# Patient Record
Sex: FEMALE | Race: WHITE | URBAN - METROPOLITAN AREA
[De-identification: names, ages, dates, MRNs, and addresses within clinical notes are randomized per-mention and may not be internally consistent; named-entity substitution may affect disease eponyms.]

---

## 2018-01-16 NOTE — PROGRESS NOTES
Assessment    1  Localized primary carpometacarpal osteoarthritis (715 14) (M19 049)    Plan  Localized primary carpometacarpal osteoarthritis    · Betamethasone Sod Phos & Acet 6 (3-3) MG/ML Injection Suspension;  INJECT 1  ML Intra-articular; To Be Done: 20Jan2016   · Betamethasone Sod Phos & Acet 6 (3-3) MG/ML Injection Suspension   · You may continue or resume your normal level of activity ; Status:Complete;   Done:  [id number]   · Joint Bursa Injection Small - POC; Status:Complete;   Done: [id number]   · Joint Bursa Injection Small - POC; Status:Complete;   Done: [id number]   · Follow-up visit in 4 Months Evaluation and Treatment  Follow-up  Status: Complete  Done:  20Jan2016    Discussion/Summary    Patient opted for steroid injections of bilateral CMC joints today  These were given in the office today  patient tolerated the procedure well  Patient should continue with routine activities, no limitations  We'll have the patient followup in 4 months time for reevaluation and possible repeat injections  Patient to call for any questions or concerns arise before then  Chief Complaint    1  Hand Problem    History of Present Illness  HPI: Patient is a very pleasant 56-year-old female who presents today for reevaluation of bilateral CMC joint arthritis  Patient has received previous steroid injections in the past and these do seem to help  Right hand is worse than left  The past medical history, surgical history, family history, social history, medications, allergies, and review of systems have been read and reviewed on the chart and have been updated  Review of Systems was recorded in the office today on a separate evaluation sheet and is listed below  Review of Systems    Constitutional: No fever, no chills, feels well, no tiredness, no recent weight gain or loss  Eyes: No complaints of eyesight problems, no red eyes  ENT: no loss of hearing, no nosebleeds, no sore throat     Cardiovascular: No complaints of chest pain, no palpitations, no leg claudication or lower extremity edema  Respiratory: no compliants of shortness of breath, no wheezing, no cough  Gastrointestinal: no complaints of abdominal pain, no constipation, no nausea or diarrhea, no vomiting, no bloody stools  Genitourinary: no complaints of dysuria, no incontinence  Musculoskeletal: as noted in HPI  Integumentary: no complaints of skin rash or lesion, no itching or dry skin, no skin wounds  Neurological: no complaints of headache, no confusion, no numbness or tingling, no dizziness  Endocrine: No complaints of muscle weakness, no feelings of weakness, no frequent urination, no excessive thirst    Psychiatric: No suicidal thoughts, no anxiety, no feelings of depression  Active Problems    1  Localized primary carpometacarpal osteoarthritis (715 14) (M19 049)    Current Meds   1  Synthroid TABS Recorded    Allergies    1  Penicillin V Potassium TABS    Vitals  Signs [Data Includes: Current Encounter]    Heart Rate: 60  Systolic: 586  Diastolic: 78  Height: 5 ft 4 in  Weight: 114 lb   BMI Calculated: 19 57  BSA Calculated: 1 55    Physical Exam      General: No acute distress, age-appropriate  Cardiovascular: No discernable arrhythmia  Respiratory: Breathing is even and unlabored, no stridor or audible wheezing  Psychiatric: Awake alert and oriented x3, normal mood and affect  Abdomen: Without rebound or guarding  Left Basal Joint: CMC and Grind, but No MP Hyperextension and No MP Ulnar laxity (Normal appearance  Neurovascularly intact)   Right Basal Joint: CMC and Grind, but No MP Hyperextension and No MP Ulnar laxity (Normal appearance  Neurovascularly intact)       Procedure           Injection: After appropriate confirmation of the patient, site, and procedure, the Bilateral CMC joints was prepared in a sterile fashion   The area was then injected with 6 milligrams of Celestone and 1 of 1% lidocaine without epinephrine  A sterile bandage was then applied  The patient tolerated the procedure well  All risks, benefits, and potential complications of the steroid injection were discussed with the patient, which were including but not limited to: Bleeding, small risk of infection, numbness and tingling within the area of injection, soreness at the injection site, potential brief exacerbation of pain, as well as steroid reactions including flushing, increased aggravation, and potential increased blood sugar in diabetics  Future Appointments    Date/Time Provider Specialty Site   05/18/2016 08:30 AM JANET Shea   Orthopedic Surgery [facility]     Signatures   Electronically signed by : Bette Coates, [facility]; Jan 20 2016  9:42AM EST                       (Author)    Electronically signed by : JANET Sawant ; Jan 20 2016  1:14PM EST

## 2020-06-01 ENCOUNTER — DOCTOR'S OFFICE (OUTPATIENT)
Dept: URBAN - METROPOLITAN AREA CLINIC 136 | Facility: CLINIC | Age: 55
Setting detail: OPHTHALMOLOGY
End: 2020-06-01
Payer: COMMERCIAL

## 2020-06-01 DIAGNOSIS — H52.223: ICD-10-CM

## 2020-06-01 DIAGNOSIS — H35.3121: ICD-10-CM

## 2020-06-01 DIAGNOSIS — H52.13: ICD-10-CM

## 2020-06-01 DIAGNOSIS — H52.4: ICD-10-CM

## 2020-06-01 PROCEDURE — 92310 CONTACT LENS FITTING OU: CPT | Performed by: OPTOMETRIST

## 2020-06-01 PROCEDURE — 92015 DETERMINE REFRACTIVE STATE: CPT | Performed by: OPTOMETRIST

## 2020-06-01 PROCEDURE — 92004 COMPRE OPH EXAM NEW PT 1/>: CPT | Performed by: OPTOMETRIST

## 2020-06-01 PROCEDURE — 92134 CPTRZ OPH DX IMG PST SGM RTA: CPT | Performed by: OPTOMETRIST

## 2020-06-01 ASSESSMENT — CONFRONTATIONAL VISUAL FIELD TEST (CVF)
OD_FINDINGS: FULL
OS_FINDINGS: FULL

## 2020-06-02 ASSESSMENT — REFRACTION_MANIFEST
OS_ADD: +2.25
OS_VA1: 20/20
OD_VA1: 20/20
OD_SPHERE: -5.50
OD_ADD: +2.25
OS_AXIS: 180
OD_AXIS: 175
OS_CYLINDER: -1.75
OS_SPHERE: -4.50
OU_VA: 20/20
OD_CYLINDER: -0.75

## 2020-06-02 ASSESSMENT — SPHEQUIV_DERIVED
OS_SPHEQUIV: -5.375
OD_SPHEQUIV: -5.875
OS_SPHEQUIV: -5.25
OD_SPHEQUIV: -2.5

## 2020-06-02 ASSESSMENT — REFRACTION_CURRENTRX
OS_SPHERE: -4.50
OD_AXIS: 173
OS_OVR_VA: 20/
OS_VPRISM_DIRECTION: PROGS
OD_SPHERE: -5.50
OD_ADD: +2.00
OD_VPRISM_DIRECTION: PROGS
OS_ADD: +2.00
OD_CYLINDER: -1.00
OS_CYLINDER: -2.25
OD_OVR_VA: 20/
OS_AXIS: 0

## 2020-06-02 ASSESSMENT — REFRACTION_AUTOREFRACTION
OD_SPHERE: -2.25
OS_SPHERE: -4.50
OS_AXIS: 017
OS_CYLINDER: -1.50
OD_AXIS: 175
OD_CYLINDER: -0.50

## 2020-06-02 ASSESSMENT — VISUAL ACUITY
OD_BCVA: 20/20
OS_BCVA: 20/20

## 2020-06-04 ENCOUNTER — OPTICAL OFFICE (OUTPATIENT)
Dept: URBAN - METROPOLITAN AREA CLINIC 143 | Facility: CLINIC | Age: 55
Setting detail: OPHTHALMOLOGY
End: 2020-06-04
Payer: COMMERCIAL

## 2020-06-04 DIAGNOSIS — H52.223: ICD-10-CM

## 2020-06-04 PROCEDURE — V2025 EYEGLASSES DELUX FRAMES: HCPCS | Performed by: OPTOMETRIST

## 2020-06-04 PROCEDURE — V2781 PROGRESSIVE LENS PER LENS: HCPCS | Performed by: OPTOMETRIST

## 2020-06-04 PROCEDURE — V2207 LENS SPHCY BIFOCAL 4.25-7D/.: HCPCS | Performed by: OPTOMETRIST

## 2020-06-04 PROCEDURE — V2750 ANTI-REFLECTIVE COATING: HCPCS | Performed by: OPTOMETRIST

## 2020-06-04 PROCEDURE — V2020 VISION SVCS FRAMES PURCHASES: HCPCS | Performed by: OPTOMETRIST

## 2020-07-16 ENCOUNTER — RX ONLY (RX ONLY)
Age: 55
End: 2020-07-16

## 2020-07-16 ENCOUNTER — DOCTOR'S OFFICE (OUTPATIENT)
Dept: URBAN - METROPOLITAN AREA CLINIC 136 | Facility: CLINIC | Age: 55
Setting detail: OPHTHALMOLOGY
End: 2020-07-16

## 2020-07-16 DIAGNOSIS — H52.13: ICD-10-CM

## 2020-07-16 PROBLEM — H52.223 ASTIGMATISM, REGULAR; BOTH EYES: Status: ACTIVE | Noted: 2020-06-01

## 2020-07-16 PROBLEM — H52.4 PRESBYOPIA ; BOTH EYES: Status: ACTIVE | Noted: 2020-06-01

## 2020-07-16 PROCEDURE — CLFUP CONTACT LENS FOLLOW-UP: Performed by: OPTOMETRIST

## 2020-07-16 ASSESSMENT — REFRACTION_CURRENTRX
OD_AXIS: 173
OD_CYLINDER: -1.00
OS_CYLINDER: -2.25
OD_OVR_VA: 20/
OS_VPRISM_DIRECTION: PROGS
OS_ADD: +2.00
OS_AXIS: 0
OD_ADD: +2.00
OD_VPRISM_DIRECTION: PROGS
OS_OVR_VA: 20/
OS_SPHERE: -4.50
OD_SPHERE: -5.50

## 2020-07-16 ASSESSMENT — SPHEQUIV_DERIVED
OD_SPHEQUIV: -5.875
OS_SPHEQUIV: -5.25
OS_SPHEQUIV: -5.375
OD_SPHEQUIV: -2.5

## 2020-07-16 ASSESSMENT — VISUAL ACUITY
OD_BCVA: 20/25
OS_BCVA: 20/20

## 2020-07-16 ASSESSMENT — REFRACTION_MANIFEST
OS_CYLINDER: -1.75
OU_VA: 20/20
OS_AXIS: 180
OD_VA1: 20/20
OS_VA1: 20/20
OS_ADD: +2.25
OD_ADD: +2.25
OD_CYLINDER: -0.75
OD_AXIS: 175
OS_SPHERE: -4.50
OD_SPHERE: -5.50

## 2020-07-16 ASSESSMENT — REFRACTION_AUTOREFRACTION
OS_SPHERE: -4.50
OD_AXIS: 175
OS_CYLINDER: -1.50
OS_AXIS: 017
OD_SPHERE: -2.25
OD_CYLINDER: -0.50

## 2021-03-26 DIAGNOSIS — Z23 ENCOUNTER FOR IMMUNIZATION: ICD-10-CM

## 2023-03-01 ENCOUNTER — LAB REQUISITION (OUTPATIENT)
Dept: LAB | Facility: HOSPITAL | Age: 58
End: 2023-03-01

## 2023-03-01 DIAGNOSIS — Z80.0 FAMILY HISTORY OF MALIGNANT NEOPLASM OF DIGESTIVE ORGANS: ICD-10-CM

## 2023-03-01 DIAGNOSIS — Z12.11 ENCOUNTER FOR SCREENING FOR MALIGNANT NEOPLASM OF COLON: ICD-10-CM

## 2023-07-27 ENCOUNTER — TELEPHONE (OUTPATIENT)
Dept: NEUROLOGY | Facility: CLINIC | Age: 58
End: 2023-07-27

## 2023-09-26 ENCOUNTER — EVALUATION (OUTPATIENT)
Dept: PHYSICAL THERAPY | Facility: MEDICAL CENTER | Age: 58
End: 2023-09-26
Payer: COMMERCIAL

## 2023-09-26 DIAGNOSIS — M54.12 CERVICAL RADICULOPATHY: Primary | ICD-10-CM

## 2023-09-26 PROCEDURE — 97162 PT EVAL MOD COMPLEX 30 MIN: CPT | Performed by: PHYSICAL THERAPIST

## 2023-09-26 NOTE — TELEPHONE ENCOUNTER
Additional Information  • Negative: Is this related to a work injury? • Negative: Is this related to an MVA? • Negative: Are you currently recieving homecare services? • Negative: Has the patient had unexplained weight loss? • Negative: Does the patient have a fever? • Negative: Is the patient experiencing urine retention? • Negative: Is the patient experiencing acute drop foot or paralysis? • Negative: Has the patient experienced major trauma? (fall from height, high speed collision, direct blow to spine) and is also experiencing nausea, light-headedness, or loss of consciousness? • Negative: Is the patient experiencing blood in sputum? • Affirmative: Is this a chronic condition?     Protocols used: [facility] COMPREHENSIVE SPINE PROGRAM PROTOCOL

## 2023-09-26 NOTE — LETTER
2023    Ladi Lucero DO  [facility]    Patient: Erwin Cobb   YOB: 1965   Date of Visit: 2023     Encounter Diagnosis     ICD-10-CM    1. Cervical radiculopathy  M54.12           Dear Dr. Swathi Vinson:    Thank you for your recent referral of Erwin Cobb. Please review the attached evaluation summary from Taya's recent visit. Please verify that you agree with the plan of care by signing the attached order. If you have any questions or concerns, please do not hesitate to call. I sincerely appreciate the opportunity to share in the care of one of your patients and hope to have another opportunity to work with you in the near future. Sincerely,    Aris Urias, PT      Referring Provider:      I certify that I have read the below Plan of Care and certify the need for these services furnished under this plan of treatment while under my care. Ladi Lucero DO  [address] [place] [address]  Via Fax: [phone]          PT Evaluation     Today's date: 2023  Patient name: Erwin Cobb  : 1965  MRN: [id number]  Referring provider: Ladi Lucero DO  Dx:   Encounter Diagnosis     ICD-10-CM    1. Cervical radiculopathy  M54.12                      Assessment/Plan  Patient is a very pleasant 66 yo female who presents with symptoms of chronic neck pain and now right sided cervical radiculopathy over the past several months. Patient's reports of pain and clinical presentation are consistent with cervical and thoracic hypomobility causing mechanical cervical  dysfunction as compensatory strategy. Patient is presenting with left C6-7 radiculopathy. Patient will benefit from skilled PT services to address these issues and return patient to higher level of activity with less pain and improved function in caring for her son with special needs. 2x a week for 4-6 weeks.      Subjective  Patient states that she has been having neck and right arm pain over the past several months. Patient cares for her son who is 31 yo. He requires help with transfers and other care which is may be the cause of some of her pain. Patient also notes that she reads while laying on her right side before bed. She will attempted to correct asymmetrical motions at home while therapy will help get her stronger and work on her mobility in her neck. Objective  Red Flags: none  Pain: 3-8/10 cervical and TLJ  Reflexes: 2/5 BLE  Posture: Increased thoracic kyphosis. Increased cervical lordosis. AROM: cervical motion limited in right rotation by 50% with pain on right with right sided radiculopathy. Lumbar is painful in all planes of motion at TLJ  PROM: deferred  PAROM: limited with UPA of C5-7 right, and TLJ B with comparable sign  Palpation:  TTP of cervical and thoracic musculature  Special Tests: + neurological signs with ULTTA into hand, - UMN signs, sensation is normal however reported tingling  Coordination of Movement/strengthe: Patient demonstrates poor activation of Longus Capitus and Longus Coli with loss of feed forward mechanism with reaching tasks.    Patient was able to perform activation with cueing.     Goals:  - Pt I with initial HEP in 1-2 visits  - Improve ROM equal to contralateral side without pain  - improved stabilizing structure activation and improved feed forward mechanism with distal activation  - Increase Functional Status Measure measured by FOTO by [facility]  - centralize symptoms out of right arm.          Precautions: none

## 2023-09-26 NOTE — TELEPHONE ENCOUNTER
Additional Information  • Negative: Is this related to a work injury? • Negative: Is this related to an MVA? • Negative: Are you currently recieving homecare services? • Negative: Has the patient had unexplained weight loss? • Negative: Does the patient have a fever? • Negative: Is the patient experiencing urine retention? • Negative: Is the patient experiencing acute drop foot or paralysis? • Negative: Has the patient experienced major trauma? (fall from height, high speed collision, direct blow to spine) and is also experiencing nausea, light-headedness, or loss of consciousness? • Negative: Is the patient experiencing blood in sputum?     Protocols used: [facility] COMPREHENSIVE SPINE PROGRAM PROTOCOL

## 2023-09-26 NOTE — TELEPHONE ENCOUNTER
Additional Information  • Negative: Is this related to a work injury? • Negative: Is this related to an MVA? • Negative: Are you currently recieving homecare services? • Negative: Has the patient had unexplained weight loss? • Negative: Does the patient have a fever? • Negative: Is the patient experiencing urine retention? • Negative: Is the patient experiencing acute drop foot or paralysis? • Negative: Has the patient experienced major trauma? (fall from height, high speed collision, direct blow to spine) and is also experiencing nausea, light-headedness, or loss of consciousness?     Protocols used: [facility] COMPREHENSIVE SPINE PROGRAM PROTOCOL

## 2023-09-26 NOTE — TELEPHONE ENCOUNTER
Additional Information  • Negative: Is this related to a work injury? • Negative: Is this related to an MVA? • Negative: Are you currently recieving homecare services? • Negative: Has the patient had unexplained weight loss?     Protocols used:  [facility] COMPREHENSIVE SPINE PROGRAM PROTOCOL

## 2023-09-26 NOTE — TELEPHONE ENCOUNTER
Additional Information  • Negative: Is this related to a work injury? • Negative: Is this related to an MVA? • Negative: Are you currently recieving homecare services? • Negative: Has the patient had unexplained weight loss? • Negative: Does the patient have a fever?     Protocols used: [facility] COMPREHENSIVE SPINE PROGRAM PROTOCOL

## 2023-09-26 NOTE — TELEPHONE ENCOUNTER
Additional Information  • Negative: Is this related to a work injury?     Protocols used: [facility] COMPREHENSIVE SPINE PROGRAM PROTOCOL

## 2023-09-26 NOTE — TELEPHONE ENCOUNTER
Additional Information  • Negative: Is this related to a work injury? • Negative: Is this related to an MVA?     Protocols used: [facility] COMPREHENSIVE SPINE PROGRAM PROTOCOL

## 2023-09-26 NOTE — TELEPHONE ENCOUNTER
Additional Information  • Negative: Is this related to a work injury? • Negative: Is this related to an MVA? • Negative: Are you currently recieving homecare services?     Protocols used: [facility] COMPREHENSIVE SPINE PROGRAM PROTOCOL

## 2023-09-26 NOTE — PROGRESS NOTES
PT Evaluation     Today's date: 2023  Patient name: Torsten Hanks  : 1965  MRN: [id number]  Referring provider: Corina Hoyos DO  Dx:   Encounter Diagnosis     ICD-10-CM    1. Cervical radiculopathy  M54.12                      Assessment/Plan  Patient is a very pleasant 66 yo female who presents with symptoms of chronic neck pain and now right sided cervical radiculopathy over the past several months. Patient's reports of pain and clinical presentation are consistent with cervical and thoracic hypomobility causing mechanical cervical  dysfunction as compensatory strategy. Patient is presenting with left C6-7 radiculopathy. Patient will benefit from skilled PT services to address these issues and return patient to higher level of activity with less pain and improved function in caring for her son with special needs. 2x a week for 4-6 weeks. Subjective  Patient states that she has been having neck and right arm pain over the past several months. Patient cares for her son who is 33 yo. He requires help with transfers and other care which is may be the cause of some of her pain. Patient also notes that she reads while laying on her right side before bed. She will attempted to correct asymmetrical motions at home while therapy will help get her stronger and work on her mobility in her neck. Objective  Red Flags: none  Pain: 3-8/10 cervical and TLJ  Reflexes: 2/5 BLE  Posture: Increased thoracic kyphosis. Increased cervical lordosis. AROM: cervical motion limited in right rotation by 50% with pain on right with right sided radiculopathy.   Lumbar is painful in all planes of motion at TLJ  PROM: deferred  PAROM: limited with UPA of C5-7 right, and TLJ B with comparable sign  Palpation:  TTP of cervical and thoracic musculature  Special Tests: + neurological signs with ULTTA into hand, - UMN signs, sensation is normal however reported tingling  Coordination of Movement/strengthe: Patient demonstrates poor activation of Longus Capitus and Longus Coli with loss of feed forward mechanism with reaching tasks.    Patient was able to perform activation with cueing.     Goals:  - Pt I with initial HEP in 1-2 visits  - Improve ROM equal to contralateral side without pain  - improved stabilizing structure activation and improved feed forward mechanism with distal activation  - Increase Functional Status Measure measured by FOTO by [facility]  - centralize symptoms out of right arm.           Precautions: none

## 2023-09-26 NOTE — TELEPHONE ENCOUNTER
Additional Information  • Negative: Is this related to a work injury? • Negative: Is this related to an MVA? • Negative: Are you currently recieving homecare services? • Negative: Has the patient had unexplained weight loss? • Negative: Does the patient have a fever? • Negative: Is the patient experiencing urine retention? • Negative: Is the patient experiencing acute drop foot or paralysis?     Protocols used: [facility] COMPREHENSIVE SPINE PROGRAM PROTOCOL

## 2023-09-26 NOTE — TELEPHONE ENCOUNTER
Additional Information  • Negative: Is this related to a work injury? • Negative: Is this related to an MVA? • Negative: Are you currently recieving homecare services? • Negative: Has the patient had unexplained weight loss? • Negative: Does the patient have a fever? • Negative: Is the patient experiencing urine retention?     Protocols used: [facility] COMPREHENSIVE SPINE PROGRAM PROTOCOL

## 2023-10-04 ENCOUNTER — OFFICE VISIT (OUTPATIENT)
Dept: PHYSICAL THERAPY | Facility: MEDICAL CENTER | Age: 58
End: 2023-10-04
Payer: COMMERCIAL

## 2023-10-04 DIAGNOSIS — M54.12 CERVICAL RADICULOPATHY: Primary | ICD-10-CM

## 2023-10-04 PROCEDURE — 97012 MECHANICAL TRACTION THERAPY: CPT | Performed by: PHYSICAL THERAPIST

## 2023-10-04 PROCEDURE — 97110 THERAPEUTIC EXERCISES: CPT | Performed by: PHYSICAL THERAPIST

## 2023-10-04 PROCEDURE — 97140 MANUAL THERAPY 1/> REGIONS: CPT | Performed by: PHYSICAL THERAPIST

## 2023-10-04 NOTE — PROGRESS NOTES
Daily Note     Today's date: 10/4/2023  Patient name: Cristino Brown  : 1965  MRN: [id number]  Referring provider: Donald Darby DO  Dx:   Encounter Diagnosis     ICD-10-CM    1. Cervical radiculopathy  M54.12                      Subjective: patient states that she was doing well however she is having severe pain in her neck and right arm after having a hard week of caring for her son. Objective: See treatment diary below      Assessment: Tolerated treatment well. Patient exhibited good technique with therapeutic exercises and would benefit from continued PT. Patient was progressed with traction and manual therapy which she tolerated well. Plan: Continue per plan of care. Precautions: none        Manuals             Cervical distraction 10min            UPA right C4-7  Gr.  Iv- 10sec x5                                      Neuro Re-Ed                                                                                                        Ther Ex                                                                                                                     Ther Activity                                       Gait Training                                       Modalities             Mechanical traction 10min 25lbs

## 2023-10-09 ENCOUNTER — OFFICE VISIT (OUTPATIENT)
Dept: PHYSICAL THERAPY | Facility: MEDICAL CENTER | Age: 58
End: 2023-10-09
Payer: COMMERCIAL

## 2023-10-09 DIAGNOSIS — M54.12 CERVICAL RADICULOPATHY: Primary | ICD-10-CM

## 2023-10-09 PROCEDURE — 97112 NEUROMUSCULAR REEDUCATION: CPT | Performed by: PHYSICAL THERAPIST

## 2023-10-09 PROCEDURE — 97012 MECHANICAL TRACTION THERAPY: CPT | Performed by: PHYSICAL THERAPIST

## 2023-10-09 PROCEDURE — 97110 THERAPEUTIC EXERCISES: CPT | Performed by: PHYSICAL THERAPIST

## 2023-10-09 PROCEDURE — 97140 MANUAL THERAPY 1/> REGIONS: CPT | Performed by: PHYSICAL THERAPIST

## 2023-10-09 NOTE — PROGRESS NOTES
Daily Note     Today's date: 10/9/2023  Patient name: Kandace Mccullough  : 1965  MRN: [id number]  Referring provider: John Evans DO  Dx:   Encounter Diagnosis     ICD-10-CM    1. Cervical radiculopathy  M54.12                      Subjective: patient states that she is feeling a bit better than her last visit. Discomfort in the arm is gone but the neck is still very stiff. Objective: See treatment diary below      Assessment: Tolerated treatment well. Patient exhibited good technique with therapeutic exercises and would benefit from continued PT. Patient was progressed with traction and manual therapy which she tolerated well. Patient was progressed with gentle stretching and postural control. Plan: Continue per plan of care. Precautions: none        Daily Treatment Diary     Manual              Cervical stretching 10min            C3-7 UPA B 10sec x5 gr.  Iv-                                                       Exercise Diary              UBE             Thoracic rotations 10x2            No monnies             Shoulder extension             Scapular retractions             Horizontal abduction             Chin tucks 15x2            Thoracic extension over foam roller 5secx3            Foam roller on wall 5 min                                                                                                                                                               Modalities              Mechanical traction 10min 25lbs

## 2023-10-11 ENCOUNTER — APPOINTMENT (OUTPATIENT)
Dept: PHYSICAL THERAPY | Facility: MEDICAL CENTER | Age: 58
End: 2023-10-11
Payer: COMMERCIAL

## 2023-10-17 ENCOUNTER — APPOINTMENT (OUTPATIENT)
Dept: PHYSICAL THERAPY | Facility: MEDICAL CENTER | Age: 58
End: 2023-10-17
Payer: COMMERCIAL

## 2023-10-20 ENCOUNTER — OFFICE VISIT (OUTPATIENT)
Dept: PHYSICAL THERAPY | Facility: MEDICAL CENTER | Age: 58
End: 2023-10-20
Payer: COMMERCIAL

## 2023-10-20 DIAGNOSIS — M54.12 CERVICAL RADICULOPATHY: Primary | ICD-10-CM

## 2023-10-20 PROCEDURE — 97110 THERAPEUTIC EXERCISES: CPT | Performed by: PHYSICAL THERAPIST

## 2023-10-20 PROCEDURE — 97140 MANUAL THERAPY 1/> REGIONS: CPT | Performed by: PHYSICAL THERAPIST

## 2023-10-20 NOTE — PROGRESS NOTES
Daily Note     Today's date: 10/20/2023  Patient name: Edgardo Bess  : 1965  MRN: [id number]  Referring provider: Jacob Sanchez DO  Dx:   Encounter Diagnosis     ICD-10-CM    1. Cervical radiculopathy  M54.12                      Subjective: patient states that she is feeling a bit better than her last visit. Discomfort in the arm is gone but the neck is still very stiff. Objective: See treatment diary below      Assessment: Tolerated treatment well. Patient exhibited good technique with therapeutic exercises and would benefit from continued PT. Patient was progressed with traction and manual therapy which she tolerated well. Patient was progressed with gentle stretching and postural control. Plan: Continue per plan of care. Precautions: none        Daily Treatment Diary     Manual              Cervical stretching 10min            C3-7 UPA B 10sec x5 gr.  Iv-                                                       Exercise Diary              UBE             Thoracic rotations 10x2            No monnies 10x2            Shoulder extension 10x2            Scapular retractions 10x2            Horizontal abduction 10x2            Chin tucks 15x2            Thoracic extension over foam roller 5secx3            Foam roller on wall 5 min                                                                                                                                                               Modalities              Mechanical traction 10min 25lbs

## 2023-10-25 ENCOUNTER — OFFICE VISIT (OUTPATIENT)
Dept: PHYSICAL THERAPY | Facility: MEDICAL CENTER | Age: 58
End: 2023-10-25
Payer: COMMERCIAL

## 2023-10-25 DIAGNOSIS — M54.12 CERVICAL RADICULOPATHY: Primary | ICD-10-CM

## 2023-10-25 PROCEDURE — 97110 THERAPEUTIC EXERCISES: CPT | Performed by: PHYSICAL THERAPIST

## 2023-10-25 PROCEDURE — 97140 MANUAL THERAPY 1/> REGIONS: CPT | Performed by: PHYSICAL THERAPIST

## 2023-10-25 NOTE — PROGRESS NOTES
Daily Note     Today's date: 10/25/2023  Patient name: Debo Snell  : 1965  MRN: [id number]  Referring provider: Danita Cunningham DO  Dx:   Encounter Diagnosis     ICD-10-CM    1. Cervical radiculopathy  M54.12                      Subjective: patient states that she is feeling a bit better than her last visit. Discomfort in the arm is gone but the neck is still very stiff. Objective: See treatment diary below      Assessment: Tolerated treatment well. Patient exhibited good technique with therapeutic exercises and would benefit from continued PT. Patient was progressed with traction and manual therapy which she tolerated well. Patient was progressed with gentle stretching and postural control. Plan: Continue per plan of care. Precautions: none        Daily Treatment Diary     Manual              Cervical stretching 10min            C3-7 UPA B 10sec x5 gr.  Iv-                                                       Exercise Diary              UBE             Thoracic rotations 10x2            No monnies 10x2            Shoulder extension 10x2            Scapular retractions 10x2            Horizontal abduction 10x2            Chin tucks 15x2            Thoracic extension over foam roller 5secx3            Foam roller on wall 5 min                                                                                                                                                               Modalities              Mechanical traction 10min 25lbs

## 2023-11-08 ENCOUNTER — OFFICE VISIT (OUTPATIENT)
Dept: PHYSICAL THERAPY | Facility: MEDICAL CENTER | Age: 58
End: 2023-11-08
Payer: COMMERCIAL

## 2023-11-08 DIAGNOSIS — M54.12 CERVICAL RADICULOPATHY: Primary | ICD-10-CM

## 2023-11-08 PROCEDURE — 97110 THERAPEUTIC EXERCISES: CPT | Performed by: PHYSICAL THERAPIST

## 2023-11-08 PROCEDURE — 97140 MANUAL THERAPY 1/> REGIONS: CPT | Performed by: PHYSICAL THERAPIST

## 2023-11-08 NOTE — PROGRESS NOTES
Daily Note     Today's date: 2023  Patient name: Gerardo Ernst  : 1965  MRN: [id number]  Referring provider: Eva Riddle DO  Dx:   Encounter Diagnosis     ICD-10-CM    1. Cervical radiculopathy  M54.12                      Subjective: patient states that she is feeling a bit better than her last visit. Discomfort in the arm currently, but the neck is still very stiff. Lots of stress at home with a  who just had a knee replacement and a son with special needs. Objective: See treatment diary below      Assessment: Tolerated treatment well. Patient exhibited good technique with therapeutic exercises and would benefit from continued PT. Patient was progressed with traction and manual therapy which she tolerated well. Patient was progressed with gentle stretching and postural control. Patient seems to respond well when she is done with treatment however with the stress of the past 2 weeks and helping her son and  the symptoms are worsened a bit this visit. Plan: Continue per plan of care. Precautions: none        Daily Treatment Diary     Manual              Cervical stretching 10min            C3-7 UPA B 10sec x5 gr.  Iv-                                                       Exercise Diary              UBE             Thoracic rotations 10x2            No monnies 10x2            Shoulder extension 10x2            Scapular retractions 10x2            Horizontal abduction 10x2            Chin tucks 15x2            Thoracic extension over foam roller 5secx3            Foam roller on wall 5 min                                                                                                                                                               Modalities              Mechanical traction 10min 30lbs

## 2023-11-15 ENCOUNTER — OFFICE VISIT (OUTPATIENT)
Dept: PHYSICAL THERAPY | Facility: MEDICAL CENTER | Age: 58
End: 2023-11-15
Payer: COMMERCIAL

## 2023-11-15 DIAGNOSIS — M54.12 CERVICAL RADICULOPATHY: Primary | ICD-10-CM

## 2023-11-15 PROCEDURE — 97110 THERAPEUTIC EXERCISES: CPT | Performed by: PHYSICAL THERAPIST

## 2023-11-15 PROCEDURE — 97140 MANUAL THERAPY 1/> REGIONS: CPT | Performed by: PHYSICAL THERAPIST

## 2023-11-15 NOTE — LETTER
November 15, 2023    Cornelius Ramos DO  [facility]    Patient: Nathan Croft   YOB: 1965   Date of Visit: 11/15/2023     Encounter Diagnosis     ICD-10-CM    1. Cervical radiculopathy  M54.12           Dear Dr. Cora Jaimes:    I am sending Michelle Joya back to you for further work up. She has not made significant progress with treatment. Please seen note. If you have any questions or concerns, please do not hesitate to call. I sincerely appreciate the opportunity to share in the care of one of your patients and hope to have another opportunity to work with you in the near future. Sincerely,    Lukas Conklin, PT      Referring Provider:      I certify that I have read the below Plan of Care and certify the need for these services furnished under this plan of treatment while under my care. Cornelius Ramos DO  [address] [place] [address]  Via Fax: [phone]          Daily Note     Today's date: 11/15/2023  Patient name: Nathan Crfot  : 1965  MRN: [id number]  Referring provider: Cornelius Ramos DO  Dx:   Encounter Diagnosis     ICD-10-CM    1. Cervical radiculopathy  M54.12                      Subjective: patient states that she doesn't feel like she has made any significant changes. Objective: See treatment diary below      Assessment: continued symptoms in the right arm and patients reports of not feeling better are concerning. Patient will be returning to Dr. Cora Jaimes for follow up and is appropriate for an MRI of the cervical spine at this point. Patient will return if needed. She will continue with her HEP. Discharge at this time. Precautions: none        Daily Treatment Diary     Manual              Cervical stretching 10min            C3-7 UPA B 10sec x5 gr.  Iv-                                                       Exercise Diary              UBE             Thoracic rotations 10x2            No monnies 10x2 Shoulder extension 10x2            Scapular retractions 10x2            Horizontal abduction 10x2            Chin tucks 15x2            Thoracic extension over foam roller 5secx3            Foam roller on wall 5 min                                                                                                                                                               Modalities              Mechanical traction 10min 30lbs

## 2023-11-15 NOTE — PROGRESS NOTES
Daily Note     Today's date: 11/15/2023  Patient name: Carrie Scott  : 1965  MRN: [id number]  Referring provider: Aviva Hartman DO  Dx:   Encounter Diagnosis     ICD-10-CM    1. Cervical radiculopathy  M54.12                      Subjective: patient states that she doesn't feel like she has made any significant changes. Objective: See treatment diary below      Assessment: continued symptoms in the right arm and patients reports of not feeling better are concerning. Patient will be returning to Dr. Wing Tony for follow up and is appropriate for an MRI of the cervical spine at this point. Patient will return if needed. She will continue with her HEP. Discharge at this time. Precautions: none        Daily Treatment Diary     Manual              Cervical stretching 10min            C3-7 UPA B 10sec x5 gr.  Iv-                                                       Exercise Diary              UBE             Thoracic rotations 10x2            No monnies 10x2            Shoulder extension 10x2            Scapular retractions 10x2            Horizontal abduction 10x2            Chin tucks 15x2            Thoracic extension over foam roller 5secx3            Foam roller on wall 5 min                                                                                                                                                               Modalities              Mechanical traction 10min 30lbs

## 2023-11-22 ENCOUNTER — APPOINTMENT (OUTPATIENT)
Dept: PHYSICAL THERAPY | Facility: MEDICAL CENTER | Age: 58
End: 2023-11-22
Payer: COMMERCIAL